# Patient Record
Sex: FEMALE | Race: AMERICAN INDIAN OR ALASKA NATIVE
[De-identification: names, ages, dates, MRNs, and addresses within clinical notes are randomized per-mention and may not be internally consistent; named-entity substitution may affect disease eponyms.]

---

## 2019-08-22 ENCOUNTER — HOSPITAL ENCOUNTER (OUTPATIENT)
Dept: HOSPITAL 5 - SPVWC | Age: 44
Discharge: HOME | End: 2019-08-22
Attending: OBSTETRICS & GYNECOLOGY
Payer: COMMERCIAL

## 2019-08-22 DIAGNOSIS — K21.9: ICD-10-CM

## 2019-08-22 DIAGNOSIS — Z12.31: Primary | ICD-10-CM

## 2019-08-22 DIAGNOSIS — J45.909: ICD-10-CM

## 2019-08-22 PROCEDURE — 77067 SCR MAMMO BI INCL CAD: CPT

## 2019-08-22 NOTE — MAMMOGRAPHY REPORT
BILATERAL DIGITAL SCREENING MAMMOGRAM WITH CAD



INDICATION: Routine screening mammography. 



TECHNIQUE:  Digital bilateral  2D mammography was obtained in the craniocaudal and mediolateral obliq
ue projections. This examination was interpreted with the benefit of Computer-Aided Detection analysi
s.



COMPARISON: None available. She has had a previous mammogram at [facility].



FINDINGS: 



Breast Density: There are scattered areas of fibroglandular density.



No mass, architectural distortion or suspicious calcifications.



IMPRESSION:No mammographic evidence of malignancy.



BI-RADS Category 1:  Negative. No mammographic evidence of malignancy.  Recommend routine screening m
ammography in one year.



A "normal" or negative report should not discourage follow up or biopsy of a clinically significant f
inding.



A written summary of these findings will be mailed to the patient. The patient will be entered into a
 mammography reporting system which will generate a reminder letter for the patient's next appointmen
t at the appropriate interval.



The [employer] recommends yearly mammograms starting at age 40 and continuing as l
enoc as a woman is in good health.  Breast MRI is recommended for women with an approximate 20-25% or 
greater lifetime risk of breast cancer, including women with a strong family history of breast or ova
mai cancer or who have been treated for Hodgkin's disease.



Signer Name: Rui Andino MD 

Signed: 8/22/2019 2:08 PM

 Workstation Name: [id number]

## 2019-09-11 ENCOUNTER — HOSPITAL ENCOUNTER (EMERGENCY)
Dept: HOSPITAL 5 - ED | Age: 44
LOS: 1 days | Discharge: HOME | End: 2019-09-12
Payer: COMMERCIAL

## 2019-09-11 VITALS — SYSTOLIC BLOOD PRESSURE: 140 MMHG | DIASTOLIC BLOOD PRESSURE: 90 MMHG

## 2019-09-11 DIAGNOSIS — J45.909: ICD-10-CM

## 2019-09-11 DIAGNOSIS — Z90.49: ICD-10-CM

## 2019-09-11 DIAGNOSIS — Z91.010: ICD-10-CM

## 2019-09-11 DIAGNOSIS — R10.9: Primary | ICD-10-CM

## 2019-09-11 DIAGNOSIS — R11.2: ICD-10-CM

## 2019-09-11 DIAGNOSIS — Z79.899: ICD-10-CM

## 2019-09-11 LAB
ALBUMIN SERPL-MCNC: 4.2 G/DL (ref 3.9–5)
ALT SERPL-CCNC: 17 UNITS/L (ref 7–56)
BACTERIA #/AREA URNS HPF: (no result) /HPF
BASOPHILS # (AUTO): 0.1 K/MM3 (ref 0–0.1)
BASOPHILS NFR BLD AUTO: 1 % (ref 0–1.8)
BILIRUB UR QL STRIP: (no result)
BLOOD UR QL VISUAL: (no result)
BUN SERPL-MCNC: 12 MG/DL (ref 7–17)
BUN/CREAT SERPL: 13 %
CALCIUM SERPL-MCNC: 9.4 MG/DL (ref 8.4–10.2)
EOSINOPHIL # BLD AUTO: 0.3 K/MM3 (ref 0–0.4)
EOSINOPHIL NFR BLD AUTO: 4.6 % (ref 0–4.3)
HCT VFR BLD CALC: 36.9 % (ref 30.3–42.9)
HEMOLYSIS INDEX: 3
HGB BLD-MCNC: 11.9 GM/DL (ref 10.1–14.3)
LYMPHOCYTES # BLD AUTO: 2.1 K/MM3 (ref 1.2–5.4)
LYMPHOCYTES NFR BLD AUTO: 31.6 % (ref 13.4–35)
MCHC RBC AUTO-ENTMCNC: 32 % (ref 30–34)
MCV RBC AUTO: 83 FL (ref 79–97)
MONOCYTES # (AUTO): 0.6 K/MM3 (ref 0–0.8)
MONOCYTES % (AUTO): 8.6 % (ref 0–7.3)
MUCOUS THREADS #/AREA URNS HPF: (no result) /HPF
PH UR STRIP: 5 [PH] (ref 5–7)
PLATELET # BLD: 250 K/MM3 (ref 140–440)
PROT UR STRIP-MCNC: (no result) MG/DL
RBC # BLD AUTO: 4.45 M/MM3 (ref 3.65–5.03)
RBC #/AREA URNS HPF: 2 /HPF (ref 0–6)
UROBILINOGEN UR-MCNC: < 2 MG/DL (ref ?–2)
WBC #/AREA URNS HPF: 1 /HPF (ref 0–6)

## 2019-09-11 PROCEDURE — 81001 URINALYSIS AUTO W/SCOPE: CPT

## 2019-09-11 PROCEDURE — 96374 THER/PROPH/DIAG INJ IV PUSH: CPT

## 2019-09-11 PROCEDURE — 80053 COMPREHEN METABOLIC PANEL: CPT

## 2019-09-11 PROCEDURE — 99284 EMERGENCY DEPT VISIT MOD MDM: CPT

## 2019-09-11 PROCEDURE — 84703 CHORIONIC GONADOTROPIN ASSAY: CPT

## 2019-09-11 PROCEDURE — 85025 COMPLETE CBC W/AUTO DIFF WBC: CPT

## 2019-09-11 PROCEDURE — 83690 ASSAY OF LIPASE: CPT

## 2019-09-11 PROCEDURE — 74177 CT ABD & PELVIS W/CONTRAST: CPT

## 2019-09-11 PROCEDURE — 96361 HYDRATE IV INFUSION ADD-ON: CPT

## 2019-09-11 PROCEDURE — 36415 COLL VENOUS BLD VENIPUNCTURE: CPT

## 2019-09-11 NOTE — EMERGENCY DEPARTMENT REPORT
HPI





- General


Chief Complaint: Abdominal Pain


Time Seen by Provider: 19 21:38





- HPI


HPI: 





43 YO WITH ACUTE ONSET N/V TODAY. SHE HAS HX OF A COLECTOMY DUE TO OBSTRUCTION. 

NO FEVER OR CHILLS. 





ED Past Medical Hx





- Past Medical History


Previous Medical History?: Yes


Hx Congestive Heart Failure: No


Hx Diabetes: No


Hx Asthma: Yes (controlled)


Hx COPD: No


Hx HIV: No





- Surgical History


Past Surgical History?: Yes


Additional Surgical History: , hernia, colectomy





- Social History


Smoking Status: Never Smoker


Substance Use Type: None





- Medications


Home Medications: 


                                Home Medications











 Medication  Instructions  Recorded  Confirmed  Last Taken  Type


 


Budesoni/Formotero 160-4.5(Nf) 2 puff IH BID 13 History





[Symbicort 160-4.5 (Nf)]     


 


Montelukast [Singulair] 10 mg PO QPM 13 History


 


Albuterol Sulfate [Albuterol 0.63% 0.63 mg IH TID PRN 13

 05:30 History





NEBS]     


 


Cetirizine HCl [Zyrtec] 10 mg PO DAILY 13 History


 


Ondansetron [Zofran Odt] 4 mg PO Q8HR PRN #10 tab.rapdis 19  Unknown Rx














ED Review of Systems


ROS: 


Stated complaint: ABD PAIN,VOMITING,DIARRHEA


Other details as noted in HPI





Comment: All other systems reviewed and negative





Physical Exam





- Physical Exam


Vital Signs: 


                                   Vital Signs











  19





  21:44


 


Temperature 97.7 F


 


Pulse Rate 63


 


Respiratory 20





Rate 


 


Blood Pressure 140/90


 


O2 Sat by Pulse 97





Oximetry 











Physical Exam: 





ALERT AND ORIENTED


S1S2


LUNGS CTA


ABD SNT


NO CVA TENDERNESS





ED Course


                                   Vital Signs











  19





  21:44


 


Temperature 97.7 F


 


Pulse Rate 63


 


Respiratory 20





Rate 


 


Blood Pressure 140/90


 


O2 Sat by Pulse 97





Oximetry 














ED Medical Decision Making





- Lab Data


Result diagrams: 


                                 19 21:56





                                 19 21:56





- Radiology Data


Radiology results: report reviewed, image reviewed





- Medical Decision Making





                                   Vital Signs











  19





  21:44


 


Temperature 97.7 F


 


Pulse Rate 63


 


Respiratory 20





Rate 


 


Blood Pressure 140/90


 


O2 Sat by Pulse 97





Oximetry 








                                   Vital Signs











  19





  21:44


 


Temperature 97.7 F


 


Pulse Rate 63


 


Respiratory 20





Rate 


 


Blood Pressure 140/90


 


O2 Sat by Pulse 97





Oximetry 








Labs











  19





  21:56 21:56 21:56


 


WBC  6.6  


 


RBC  4.45  


 


Hgb  11.9  


 


Hct  36.9  


 


MCV  83  


 


MCH  27 L  


 


MCHC  32  


 


RDW  16.9 H  


 


Plt Count  250  


 


Lymph % (Auto)  31.6  


 


Mono % (Auto)  8.6 H  


 


Eos % (Auto)  4.6 H  


 


Baso % (Auto)  1.0  


 


Lymph #  2.1  


 


Mono #  0.6  


 


Eos #  0.3  


 


Baso #  0.1  


 


Seg Neutrophils %  54.2  


 


Seg Neutrophils #  3.6  


 


Sodium   138 


 


Potassium   4.8 


 


Chloride   101.7 


 


Carbon Dioxide   25 


 


Anion Gap   16 


 


BUN   12 


 


Creatinine   0.9 


 


Estimated GFR   > 60 


 


BUN/Creatinine Ratio   13 


 


Glucose   93 


 


Calcium   9.4 


 


Total Bilirubin   < 0.20 


 


AST   19 


 


ALT   17 


 


Alkaline Phosphatase   71 


 


Total Protein   7.7 


 


Albumin   4.2 


 


Albumin/Globulin Ratio   1.2 


 


Lipase   68 H 


 


HCG, Qual    Negative


 


Urine Color   


 


Urine Turbidity   


 


Urine pH   


 


Ur Specific Gravity   


 


Urine Protein   


 


Urine Glucose (UA)   


 


Urine Ketones   


 


Urine Blood   


 


Urine Nitrite   


 


Urine Bilirubin   


 


Urine Urobilinogen   


 


Ur Leukocyte Esterase   


 


Urine WBC (Auto)   


 


Urine RBC (Auto)   


 


U Epithel Cells (Auto)   


 


Urine Bacteria (Auto)   


 


Urine Mucus   














  19





  22:25


 


WBC 


 


RBC 


 


Hgb 


 


Hct 


 


MCV 


 


MCH 


 


MCHC 


 


RDW 


 


Plt Count 


 


Lymph % (Auto) 


 


Mono % (Auto) 


 


Eos % (Auto) 


 


Baso % (Auto) 


 


Lymph # 


 


Mono # 


 


Eos # 


 


Baso # 


 


Seg Neutrophils % 


 


Seg Neutrophils # 


 


Sodium 


 


Potassium 


 


Chloride 


 


Carbon Dioxide 


 


Anion Gap 


 


BUN 


 


Creatinine 


 


Estimated GFR 


 


BUN/Creatinine Ratio 


 


Glucose 


 


Calcium 


 


Total Bilirubin 


 


AST 


 


ALT 


 


Alkaline Phosphatase 


 


Total Protein 


 


Albumin 


 


Albumin/Globulin Ratio 


 


Lipase 


 


HCG, Qual 


 


Urine Color  Yellow


 


Urine Turbidity  Slightly-cloudy


 


Urine pH  5.0


 


Ur Specific Gravity  1.034 H


 


Urine Protein  <15 mg/dl


 


Urine Glucose (UA)  Neg


 


Urine Ketones  Tr


 


Urine Blood  Neg


 


Urine Nitrite  Neg


 


Urine Bilirubin  Neg


 


Urine Urobilinogen  < 2.0


 


Ur Leukocyte Esterase  Tr


 


Urine WBC (Auto)  1.0


 


Urine RBC (Auto)  2.0


 


U Epithel Cells (Auto)  4.0


 


Urine Bacteria (Auto)  1+


 


Urine Mucus  1+








CT NOTED





PT HAS HAD NO ACTIVE N/V IN ER





DC HOME WITH GI FOLLOW UP








- Differential Diagnosis


RO ACUTE ABD FROM OBSTRUCTION/ADHESIONS


Critical care attestation.: 


If time is entered above; I have spent that time in minutes in the direct care 

of this critically ill patient, excluding procedure time.








ED Disposition


Clinical Impression: 


 Abdominal pain





Disposition: DC-01 TO HOME OR SELFCARE


Is pt being admited?: No


Does the pt Need Aspirin: No


Condition: Stable


Instructions:  Abdominal Pain (ED)


Additional Instructions: 


BLAND DIET





PROGRESS AS TOLERATED





STAY WELL HYDRATED





MED AS ORDERED FOR NAUSEA





FOLLOW UP WITH GI MD NEXT WEEK IF PROBLEM PERSISTS OR RECURS








Prescriptions: 


Ondansetron [Zofran Odt] 4 mg PO Q8HR PRN #10 tab.rapdis


 PRN Reason: Vomiting


Referrals: 


[facility], MD [Primary Care Provider] - 3-5 Days


MILENA DUNN MD [Staff Physician] - 3-5 Days


GERRY MOELLER MD [Staff Physician] - 3-5 Days


MAXWELL GELLER MD [Staff Physician] - 3-5 Days


Time of Disposition: 03:00

## 2019-09-11 NOTE — EMERGENCY DEPARTMENT REPORT
Blank Doc





- Documentation


Documentation: 





44-year-old female that presents with abdominal pain and n/v.





This initial assessment/diagnostic orders/clinical plan/treatment(s) is/are 

subject to change based on patient's health status, clinical progression and re-

assessment by fellow clinical providers in the ED.  Further treatment and workup

at subsequent clinical providers discretion.  Patient/guardians urged not to 

elope from the ED as their condition may be serious if not clinically assessed 

and managed.  Initial orders include:


1- Patient sent to [facility] for further evaluation and treatment


2- labs


3- UA

## 2019-09-12 NOTE — CAT SCAN REPORT
CT ABDOMEN AND PELVIS WITH CONTRAST



INDICATION: abd pain hx of colectomy



CONTRAST: 100 cc Omnipaque 300 IV



COMPARISON: None available.



All CT scans at this location are performed using CT dose reduction for ALARA by means of automated e
xposure control.



NOTE: Resolution is decreased and artifact is introduced by the patient's size.



FINDINGS: Lung bases are clear. No pneumoperitoneum is seen. Midline abdominal wall laxity is noted i
n an area of previous hernia repair. Mild fatty infiltration of the liver is seen. Liver is not signi
ficantly enlarged. No focal lesions are seen. Spleen appears within normal limits. Minimal hiatal her
valentino is seen. No urinary obstructive changes are noted. Patient has had a near-total colectomy with on
ly the rectosigmoid colon remaining. There is probably chronic dilatation at the surgical site in the
 upper sigmoid area. I do not see evidence of bowel obstruction. No focal inflammatory changes are se
en. No lymphadenopathy is noted. Bilateral ovarian cysts are seen measuring up to 2.8 cm on the left 
without obvious acute change. Uterus is prominent in the fundal region and there probably are uterine
 leiomyomata.





IMPRESSION: No acute abnormalities are seen





Signer Name: Dhruv Bull MD 

Signed: 9/12/2019 2:59 AM

 Workstation Name: [id number]

## 2019-10-22 PROBLEM — 429006005 FAMILY HISTORY OF MALIGNANT NEOPLASM OF GASTROINTESTINAL TRACT: Status: ACTIVE | Noted: 2019-10-22

## 2019-10-22 PROBLEM — 266435005 GASTRO-ESOPHAGEAL REFLUX DISEASE WITHOUT ESOPHAGITIS: Status: ACTIVE | Noted: 2019-10-22

## 2019-10-22 PROBLEM — 40739000 DYSPHAGIA: Status: ACTIVE | Noted: 2019-10-22

## 2019-10-22 PROBLEM — 14760008 CONSTIPATION: Status: ACTIVE | Noted: 2019-10-22

## 2019-10-22 PROBLEM — 16932000 NAUSEA AND VOMITING: Status: ACTIVE | Noted: 2019-10-22

## 2019-10-22 PROBLEM — 414916001 OBESITY: Status: ACTIVE | Noted: 2019-10-22

## 2019-10-22 PROBLEM — 87522002 IRON DEFICIENCY ANEMIA: Status: ACTIVE | Noted: 2019-10-22

## 2019-10-22 PROBLEM — 79922009 EPIGASTRIC PAIN: Status: ACTIVE | Noted: 2019-10-22

## 2021-08-28 ENCOUNTER — TELEPHONE ENCOUNTER (OUTPATIENT)
Dept: URBAN - METROPOLITAN AREA CLINIC 13 | Facility: CLINIC | Age: 46
End: 2021-08-28

## 2021-08-29 ENCOUNTER — TELEPHONE ENCOUNTER (OUTPATIENT)
Dept: URBAN - METROPOLITAN AREA CLINIC 13 | Facility: CLINIC | Age: 46
End: 2021-08-29

## 2022-04-30 ENCOUNTER — TELEPHONE ENCOUNTER (OUTPATIENT)
Dept: URBAN - METROPOLITAN AREA CLINIC 121 | Facility: CLINIC | Age: 47
End: 2022-04-30

## 2022-05-01 ENCOUNTER — TELEPHONE ENCOUNTER (OUTPATIENT)
Dept: URBAN - METROPOLITAN AREA CLINIC 121 | Facility: CLINIC | Age: 47
End: 2022-05-01

## 2022-05-01 RX ORDER — MULTIVIT-MIN/FOLIC/VIT K/LYCOP 400-300MCG
TABLET ORAL
Status: ACTIVE | COMMUNITY
Start: 2019-10-22

## 2022-05-01 RX ORDER — MONTELUKAST 10 MG/1
TABLET, FILM COATED ORAL
Status: ACTIVE | COMMUNITY
Start: 2019-10-22

## 2022-05-01 RX ORDER — IRON FUM,PS CMP/VIT C/NIACIN 125-40-3MG
CAPSULE ORAL
Status: ACTIVE | COMMUNITY
Start: 2019-10-22

## 2022-05-01 RX ORDER — ELECTROLYTES/DEXTROSE
SOLUTION, ORAL ORAL
Status: ACTIVE | COMMUNITY
Start: 2019-10-22